# Patient Record
Sex: FEMALE | Race: WHITE | Employment: OTHER | URBAN - METROPOLITAN AREA
[De-identification: names, ages, dates, MRNs, and addresses within clinical notes are randomized per-mention and may not be internally consistent; named-entity substitution may affect disease eponyms.]

---

## 2019-05-13 ENCOUNTER — TELEPHONE (OUTPATIENT)
Dept: PULMONOLOGY | Age: 84
End: 2019-05-13

## 2019-05-30 ENCOUNTER — OFFICE VISIT (OUTPATIENT)
Dept: PULMONOLOGY | Age: 84
End: 2019-05-30
Payer: MEDICARE

## 2019-05-30 VITALS
TEMPERATURE: 98 F | HEART RATE: 65 BPM | OXYGEN SATURATION: 96 % | RESPIRATION RATE: 14 BRPM | HEIGHT: 61 IN | WEIGHT: 165 LBS | BODY MASS INDEX: 31.15 KG/M2 | DIASTOLIC BLOOD PRESSURE: 87 MMHG | SYSTOLIC BLOOD PRESSURE: 142 MMHG

## 2019-05-30 DIAGNOSIS — I10 ESSENTIAL HYPERTENSION: ICD-10-CM

## 2019-05-30 DIAGNOSIS — Z78.9 NON-SMOKER: ICD-10-CM

## 2019-05-30 DIAGNOSIS — R91.8 INFILTRATE OF UPPER LOBE OF RIGHT LUNG PRESENT ON IMAGING STUDY: ICD-10-CM

## 2019-05-30 DIAGNOSIS — Z80.9 FAMILY HISTORY OF CANCER: ICD-10-CM

## 2019-05-30 DIAGNOSIS — K44.9 PARAESOPHAGEAL HIATAL HERNIA: ICD-10-CM

## 2019-05-30 DIAGNOSIS — F51.04 PSYCHOPHYSIOLOGICAL INSOMNIA: ICD-10-CM

## 2019-05-30 DIAGNOSIS — C34.32 MALIGNANT NEOPLASM OF LOWER LOBE OF LEFT LUNG (HCC): ICD-10-CM

## 2019-05-30 DIAGNOSIS — R06.02 SHORTNESS OF BREATH: Primary | ICD-10-CM

## 2019-05-30 PROCEDURE — 99204 OFFICE O/P NEW MOD 45 MIN: CPT | Performed by: INTERNAL MEDICINE

## 2019-05-30 RX ORDER — NAPROXEN 500 MG/1
500 TABLET ORAL 2 TIMES DAILY WITH MEALS
COMMUNITY

## 2019-05-30 RX ORDER — METOPROLOL SUCCINATE 100 MG/1
100 TABLET, EXTENDED RELEASE ORAL DAILY
COMMUNITY

## 2019-05-30 RX ORDER — SPIRONOLACTONE 25 MG/1
25 TABLET ORAL DAILY
COMMUNITY
Start: 2019-02-27

## 2019-05-30 RX ORDER — PANTOPRAZOLE SODIUM 40 MG/1
40 TABLET, DELAYED RELEASE ORAL DAILY
COMMUNITY

## 2019-05-30 RX ORDER — FUROSEMIDE 20 MG/1
20 TABLET ORAL DAILY
COMMUNITY
Start: 2019-02-26

## 2019-05-30 RX ORDER — UBIDECARENONE 10 MG
CAPSULE ORAL
COMMUNITY

## 2019-05-30 RX ORDER — NITROGLYCERIN 0.4 MG/1
0.4 TABLET SUBLINGUAL EVERY 5 MIN PRN
COMMUNITY

## 2019-05-30 SDOH — HEALTH STABILITY: MENTAL HEALTH: HOW OFTEN DO YOU HAVE A DRINK CONTAINING ALCOHOL?: NEVER

## 2019-05-30 ASSESSMENT — ENCOUNTER SYMPTOMS
EYES NEGATIVE: 1
GASTROINTESTINAL NEGATIVE: 1
CHEST TIGHTNESS: 1
ALLERGIC/IMMUNOLOGIC NEGATIVE: 1
SHORTNESS OF BREATH: 1

## 2019-05-30 NOTE — PROGRESS NOTES
Subjective:      Patient ID: Nghia Starr is a 80 y.o. female. HPI  New patient visit, referred by Dr. Kiara Hercules, for evaluation of shortness of breath and abnormal imaging study. Patient is a difficult historian. She has difficulty communicating and recalling the events of her illness. She is accompanied by her  and daughter. Insidious onset of shortness of breath over the last several months to years. Currently as part of her evaluation she had a chest x-ray done at [address] on 5/6/19. The radiologist reported postsurgical changes as well as a nonspecific finding within the left lung base. CT scan of the chest was suggested for further evaluation. This imaging is not available for my review. Reportedly the CT scan of her chest done a few days later showed left lower lobectomy without changes to suggest recurrence or adenopathy. There was a 2 cm patchy infiltrate versus scarring at the lateral posterior aspect of the right upper lobe. A previous x-ray report from July 3, 2003 described a faint increased interstitial-like density within the periphery of the right upper lobe that was present previously. Minimal pleural thickening/reaction along the posterior lateral aspect of the left mid lower chest appeared to be chronic in nature and there was a large hiatal hernia with most of the stomach fold that upon itself between the heart, spine, and aorta and apparently had increased in size from the prior report. There was mild hyperplasia of the left adrenal gland compared to the right. Again, imaging not available for my review. Patient has a remote history of lung cancer and is status post left lower lobectomy. Lifelong nonsmoker. Surgery was performed in [facility]. No history of recurrence. The patient specifically denies a history of asthma or allergies. Denies occupational exposures although she did work in a [address] [employer]   She denies cardiac symptoms. No echocardiogram.  Denies orthopnea, paroxysmal nocturnal dyspnea, or symptoms of sleep apnea. No exercise-induced syncope. No history of using diet pills. No history of venous thromboembolism. Patient believes she may have had pneumonia at some time. No further details. Patient has a history of hypertension which apparently is difficult to control. Also states that her blood pressure is quite labile. Takes her medications intermittently.  claims that there are some mornings when her blood pressure is \"90/50. \"  Apparently they hold her medication. Also reports pedal edema. Current Outpatient Medications   Medication Sig Dispense Refill    furosemide (LASIX) 20 MG tablet       spironolactone (ALDACTONE) 25 MG tablet       nitroGLYCERIN (NITROSTAT) 0.4 MG SL tablet Place 0.4 mg under the tongue every 5 minutes as needed for Chest pain up to max of 3 total doses. If no relief after 1 dose, call 911.  pantoprazole (PROTONIX) 40 MG tablet Take 40 mg by mouth daily      metoprolol succinate (TOPROL XL) 100 MG extended release tablet Take 100 mg by mouth daily      aspirin 81 MG tablet Take 81 mg by mouth daily      naproxen (NAPROSYN) 500 MG tablet Take 500 mg by mouth 2 times daily (with meals)      Coenzyme Q10 10 MG CAPS Take by mouth       No current facility-administered medications for this visit. Family History   Problem Relation Age of Onset    Heart Disease Mother     Heart Disease Father     Diabetes Sister     Cancer Brother        Social History     Tobacco Use    Smoking status: Never Smoker   Substance Use Topics    Alcohol use: Never     Frequency: Never     Binge frequency: Never    Drug use: Never       Review of Systems   Constitutional: Negative. HENT: Negative. Eyes: Negative. Respiratory: Positive for chest tightness and shortness of breath. Cardiovascular: Positive for leg swelling. Gastrointestinal: Negative.     Endocrine: Negative. Genitourinary: Negative. Musculoskeletal: Negative. Skin: Negative. Allergic/Immunologic: Negative. Neurological: Negative. Hematological: Negative. Psychiatric/Behavioral: Negative. All other systems reviewed and are negative. Objective:     Physical Exam   Constitutional: She is oriented to person, place, and time. She appears well-developed and well-nourished. HENT:   Head: Normocephalic. Right Ear: External ear normal.   Left Ear: External ear normal.   Mouth/Throat: Oropharynx is clear and moist. No oropharyngeal exudate. Eyes: Conjunctivae are normal. No scleral icterus. Neck: Neck supple. No JVD present. No tracheal deviation present. No thyromegaly present. Cardiovascular: Normal rate, regular rhythm and normal heart sounds. Exam reveals no gallop. No murmur heard. P2 not increased. Pulmonary/Chest: She is in respiratory distress. She has no wheezes. She has no rales. She exhibits no tenderness. Abdominal: Soft. She exhibits no distension and no mass. There is no tenderness. There is no rebound and no guarding. No hernia. Musculoskeletal: She exhibits no edema. Lymphadenopathy:     She has no cervical adenopathy. Neurological: She is alert and oriented to person, place, and time. Skin: Skin is warm and dry. Nursing note and vitals reviewed. Wt Readings from Last 3 Encounters:   05/30/19 165 lb (74.8 kg)       No results found for this or any previous visit. Assessment:      1. Shortness of breath    2. Malignant neoplasm of lower lobe of left lung ([address]    3. Non-smoker    4. Family history of cancer    5. Infiltrate of upper lobe of right lung present on imaging study    6. Psychophysiological insomnia    7. Essential hypertension    8. Paraesophageal hiatal hernia          Plan:      1. Complete pulmonary function studies. 2. 2-D echocardiogram for left ventricular size and function.   3. Follow-up with Dr. Carmelita Pelaze regarding control of hypertension. 4. Encouraged regular exercise. 5. Obtain imaging for review. 6. Possible repeat CT scan of the chest to ensure stability of abnormalities. 7. Long discussion with patient and family. Unlikely that any of these abnormalities represent serious intrathoracic pathology. 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 8. Thanks for the kind referral.  We will keep you posted as the workup proceeds.       Electronically signed by Charles Kaiser DO on 5/30/2019 at 5:53 PM

## 2019-07-17 ENCOUNTER — HOSPITAL ENCOUNTER (OUTPATIENT)
Dept: NON INVASIVE DIAGNOSTICS | Age: 84
Discharge: HOME | End: 2019-07-17
Payer: MEDICARE

## 2019-07-17 ENCOUNTER — HOSPITAL ENCOUNTER (OUTPATIENT)
Dept: PULMONOLOGY | Age: 84
Discharge: HOME | End: 2019-07-17
Payer: MEDICARE

## 2019-07-17 DIAGNOSIS — I10 ESSENTIAL HYPERTENSION: ICD-10-CM

## 2019-07-17 DIAGNOSIS — R06.02 SHORTNESS OF BREATH: ICD-10-CM

## 2019-07-17 LAB
LV EF: 60 %
LVEF MODALITY: NORMAL

## 2019-07-17 PROCEDURE — 94060 EVALUATION OF WHEEZING: CPT

## 2019-07-17 PROCEDURE — 93306 TTE W/DOPPLER COMPLETE: CPT

## 2019-07-17 PROCEDURE — 94726 PLETHYSMOGRAPHY LUNG VOLUMES: CPT

## 2019-07-17 PROCEDURE — 94664 DEMO&/EVAL PT USE INHALER: CPT

## 2019-07-17 PROCEDURE — 94729 DIFFUSING CAPACITY: CPT

## 2019-07-17 PROCEDURE — 6360000002 HC RX W HCPCS: Performed by: INTERNAL MEDICINE

## 2019-07-17 RX ORDER — ALBUTEROL SULFATE 2.5 MG/3ML
2.5 SOLUTION RESPIRATORY (INHALATION) ONCE
Status: COMPLETED | OUTPATIENT
Start: 2019-07-17 | End: 2019-07-17

## 2019-07-17 RX ADMIN — ALBUTEROL SULFATE 2.5 MG: 2.5 SOLUTION RESPIRATORY (INHALATION) at 13:28

## 2019-07-25 ENCOUNTER — OFFICE VISIT (OUTPATIENT)
Dept: PULMONOLOGY | Age: 84
End: 2019-07-25
Payer: MEDICARE

## 2019-07-25 VITALS
SYSTOLIC BLOOD PRESSURE: 187 MMHG | RESPIRATION RATE: 20 BRPM | TEMPERATURE: 98.4 F | OXYGEN SATURATION: 95 % | DIASTOLIC BLOOD PRESSURE: 93 MMHG | BODY MASS INDEX: 32.39 KG/M2 | HEIGHT: 60 IN | HEART RATE: 53 BPM | WEIGHT: 165 LBS

## 2019-07-25 DIAGNOSIS — R91.8 ABNORMAL CT SCAN OF LUNG: ICD-10-CM

## 2019-07-25 DIAGNOSIS — I27.20 PULMONARY HTN (HCC): ICD-10-CM

## 2019-07-25 DIAGNOSIS — R06.09 DYSPNEA ON EXERTION: Primary | ICD-10-CM

## 2019-07-25 PROCEDURE — 1090F PRES/ABSN URINE INCON ASSESS: CPT | Performed by: INTERNAL MEDICINE

## 2019-07-25 PROCEDURE — 4040F PNEUMOC VAC/ADMIN/RCVD: CPT | Performed by: INTERNAL MEDICINE

## 2019-07-25 PROCEDURE — 99214 OFFICE O/P EST MOD 30 MIN: CPT | Performed by: INTERNAL MEDICINE

## 2019-07-25 PROCEDURE — 1123F ACP DISCUSS/DSCN MKR DOCD: CPT | Performed by: INTERNAL MEDICINE

## 2019-07-25 PROCEDURE — 1036F TOBACCO NON-USER: CPT | Performed by: INTERNAL MEDICINE

## 2019-07-25 PROCEDURE — G8417 CALC BMI ABV UP PARAM F/U: HCPCS | Performed by: INTERNAL MEDICINE

## 2019-07-25 PROCEDURE — G8427 DOCREV CUR MEDS BY ELIG CLIN: HCPCS | Performed by: INTERNAL MEDICINE

## 2019-07-25 NOTE — PROGRESS NOTES
and cyanosis  CARDIOVASCULAR:  negative for  chest pain, palpitations, orthopnea, PND, edema, syncope  GASTROINTESTINAL:  negative for nausea, vomiting, diarrhea, constipation, abdominal pain, abdominal distention, jaundice, dysphagia, hematemesis and hemtochezia  GENITOURINARY:  negative for frequency, dysuria, urinary incontinence, hesitancy, decreased stream and hematuria  HEMATOLOGIC/LYMPHATIC:  negative for easy bruising, bleeding, lymphadenopathy and petechiae  ALLERGIC/IMMUNOLOGIC:  negative for recurrent infections, urticaria, hay fever, angioedema, anaphylaxis and drug reactions  ENDOCRINE:  negative for heat intolerance, cold intolerance, weight changes and change in bowel habits  MUSCULOSKELETAL:  negative for  myalgias, arthralgias, joint swelling and stiff joints  NEUROLOGICAL:  negative for headaches, dizziness, seizures, memory problems, speech problems, visual disturbance and coordination problems  BEHAVIOR/PSYCH:  negative          Physical Exam:    Vitals: BP (!) 187/93   Pulse 53   Temp 98.4 °F (36.9 °C)   Resp 20   Ht 5' (1.524 m)   Wt 165 lb (74.8 kg)   SpO2 95%   BMI 32.22 kg/m²   Last 3 weights: Wt Readings from Last 3 Encounters:   07/25/19 165 lb (74.8 kg)   05/30/19 165 lb (74.8 kg)     Body mass index is 32.22 kg/m².     Physical Examination:   General appearance - alert, well appearing, and in no distress, oriented to person, place, and time and acyanotic, in no respiratory distress  Mental status - alert, oriented to person, place, and time  Eyes - pupils equal and reactive, extraocular eye movements intact, sclera anicteric  Ears - right ear normal, left ear normal  Nose - normal and patent, no erythema, discharge or polyps  Mouth - mucous membranes moist, pharynx normal without lesions  Neck - supple, no significant adenopathy  Chest - no tachypnea, retractions or cyanosis, bilateral symmetrical chest movement, slightly decreased resonance at left base and decreased upon discussion on left side, air entry is bilateral, no expiratory wheezing rhonchi or crackles  Heart - normal rate, regular rhythm, normal S1, S2, no murmurs, rubs, clicks or gallops  Abdomen - soft, nontender, nondistended, no masses or organomegaly  Neurological - alert, oriented, normal speech, no focal findings or movement disorder noted}  Extremities - peripheral pulses normal, no pedal edema, no clubbing or cyanosis  Skin - normal coloration and turgor, no rashes, no suspicious skin lesions noted       LABS:    CBC: No results found for: WBC, HGB, PLT  BMP: No results found for: NA, K, CL, CO2, BUN, CREATININE, GLUCOSE  Hepatic: No results found for: AST, ALT, ALB, BILITOT, ALKPHOS  Amylase: No results found for: AMYLASE  Lipase: No results found for: LIPASE  CARDIAC ENZYMES: No results found for: CKTOTAL, CKMB, CKMBINDEX, TROPONINI  BNP: No results found for: BNP  Lipids: No results found for: CHOL, HDL    INR: No results found for: INR  Thyroid: No results found for: TSH  Urinalysis: No results found for: BACTERIA, BLOODU, CLARITYU, COLORU, PHUR, PROTEINU, RBCUA, SPECGRAV, BILIRUBINUR, NITRU, WBCUA, LEUKOCYTESUR, GLUCOSEU  Cultures:-  -----------------------------------------------------------------    ABGs: No results found for: PHART, PO2ART, [id number]    Pulmonary Functions Testing Results:  DATE OF PROCEDURE:  07/17/2019     FINDINGS:  Spirometry shows evidence of stage I obstructive ventilatory  defect, which does not change with bronchodilator therapy. The FEV1 is  1.49 liters. Lung volumes show minimal air trapping. Diffusion  capacity is decreased at 69% predicted. Airway resistance and specific  conductance are normal.  Flow-volume loop shows an obstructive  ventilatory pattern.     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 Even though there is lack of response to bronchodilators in the test, a  clinical improvement with bronchodilator therapy cannot be excluded.   No

## 2019-08-02 ENCOUNTER — HOSPITAL ENCOUNTER (OUTPATIENT)
Dept: CT IMAGING | Age: 84
Discharge: HOME | End: 2019-08-04
Payer: MEDICARE

## 2019-08-02 DIAGNOSIS — R91.8 ABNORMAL CT SCAN OF LUNG: ICD-10-CM

## 2019-08-02 PROCEDURE — 71250 CT THORAX DX C-: CPT

## 2019-08-22 ENCOUNTER — OFFICE VISIT (OUTPATIENT)
Dept: PULMONOLOGY | Age: 84
End: 2019-08-22
Payer: MEDICARE

## 2019-08-22 VITALS
WEIGHT: 172.8 LBS | DIASTOLIC BLOOD PRESSURE: 86 MMHG | HEART RATE: 59 BPM | RESPIRATION RATE: 22 BRPM | SYSTOLIC BLOOD PRESSURE: 176 MMHG | BODY MASS INDEX: 33.92 KG/M2 | OXYGEN SATURATION: 95 % | TEMPERATURE: 97.3 F | HEIGHT: 60 IN

## 2019-08-22 DIAGNOSIS — J44.9 CHRONIC OBSTRUCTIVE PULMONARY DISEASE, UNSPECIFIED COPD TYPE (HCC): ICD-10-CM

## 2019-08-22 DIAGNOSIS — R91.8 LUNG MASS: Primary | ICD-10-CM

## 2019-08-22 DIAGNOSIS — C34.32 MALIGNANT NEOPLASM OF LOWER LOBE OF LEFT LUNG (HCC): ICD-10-CM

## 2019-08-22 DIAGNOSIS — R91.8 ABNORMAL CT SCAN OF LUNG: ICD-10-CM

## 2019-08-22 PROCEDURE — 99214 OFFICE O/P EST MOD 30 MIN: CPT | Performed by: NURSE PRACTITIONER

## 2019-08-22 PROCEDURE — 1036F TOBACCO NON-USER: CPT | Performed by: NURSE PRACTITIONER

## 2019-08-22 PROCEDURE — G8427 DOCREV CUR MEDS BY ELIG CLIN: HCPCS | Performed by: NURSE PRACTITIONER

## 2019-08-22 PROCEDURE — 3023F SPIROM DOC REV: CPT | Performed by: NURSE PRACTITIONER

## 2019-08-22 PROCEDURE — 1090F PRES/ABSN URINE INCON ASSESS: CPT | Performed by: NURSE PRACTITIONER

## 2019-08-22 PROCEDURE — 1123F ACP DISCUSS/DSCN MKR DOCD: CPT | Performed by: NURSE PRACTITIONER

## 2019-08-22 PROCEDURE — 4040F PNEUMOC VAC/ADMIN/RCVD: CPT | Performed by: NURSE PRACTITIONER

## 2019-08-22 PROCEDURE — G8926 SPIRO NO PERF OR DOC: HCPCS | Performed by: NURSE PRACTITIONER

## 2019-08-22 PROCEDURE — G8417 CALC BMI ABV UP PARAM F/U: HCPCS | Performed by: NURSE PRACTITIONER

## 2019-08-22 ASSESSMENT — ENCOUNTER SYMPTOMS
GASTROINTESTINAL NEGATIVE: 1
ALLERGIC/IMMUNOLOGIC NEGATIVE: 1
EYES NEGATIVE: 1

## 2019-12-12 ENCOUNTER — TELEPHONE (OUTPATIENT)
Dept: GASTROENTEROLOGY | Age: 84
End: 2019-12-12

## 2019-12-13 NOTE — TELEPHONE ENCOUNTER
I need more information about the indication. The referral says \"stomach collapse\" but I have no idea what that means.

## 2020-01-15 NOTE — TELEPHONE ENCOUNTER
Called Dr Dane Chris office spoke with [facility] in regards of needing more information.  She stated the patient told her she had a stomach collapse , [facility] is going to call the patient and get more information,she will then fax it to the office

## 2020-01-16 NOTE — TELEPHONE ENCOUNTER
I should see this lady in the office before considering an EGD. I will need the actual images from her CT scans at [facility] and [facility] orthopedics. I should be able to access the [id number] one in [place] [facility].

## 2020-01-20 NOTE — TELEPHONE ENCOUNTER
All images should be able to be viewed as the disc from [facility] was taken to Radiology for upload and [facility] was to upload to the cloud.

## 2020-01-20 NOTE — TELEPHONE ENCOUNTER
Contacted Christopher at [place] [id number], he is going to bring the disc to our office today. Eve Arvizu at [facility] she is going to upload images through the cloud.

## 2020-01-21 NOTE — TELEPHONE ENCOUNTER
I reviewed the images and, on further consideration, I think she should be seen at a larger center given the complexity of her situation.

## 2020-01-22 NOTE — TELEPHONE ENCOUNTER
Notified Magui Kwong at Dr. Carrillo Po office of Dr. Julia Romeo note. She voiced understanding and stated she would let Dr. Priscilla Sanders know.

## 2020-02-26 ENCOUNTER — HOSPITAL ENCOUNTER (OUTPATIENT)
Dept: CT IMAGING | Age: 85
Discharge: HOME | End: 2020-02-28
Payer: MEDICARE

## 2020-02-26 PROCEDURE — 71250 CT THORAX DX C-: CPT
